# Patient Record
Sex: MALE | Race: WHITE | NOT HISPANIC OR LATINO
[De-identification: names, ages, dates, MRNs, and addresses within clinical notes are randomized per-mention and may not be internally consistent; named-entity substitution may affect disease eponyms.]

---

## 2018-06-28 ENCOUNTER — MOBILE ON CALL (OUTPATIENT)
Age: 83
End: 2018-06-28

## 2018-08-01 ENCOUNTER — APPOINTMENT (OUTPATIENT)
Dept: CARDIOLOGY | Facility: CLINIC | Age: 83
End: 2018-08-01

## 2019-09-09 NOTE — ASU PATIENT PROFILE, ADULT - PMH
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

## 2019-09-10 ENCOUNTER — OUTPATIENT (OUTPATIENT)
Dept: OUTPATIENT SERVICES | Facility: HOSPITAL | Age: 84
LOS: 1 days | Discharge: HOME | End: 2019-09-10

## 2019-09-10 VITALS
RESPIRATION RATE: 17 BRPM | HEART RATE: 79 BPM | WEIGHT: 138.01 LBS | HEIGHT: 65 IN | TEMPERATURE: 97 F | OXYGEN SATURATION: 97 % | SYSTOLIC BLOOD PRESSURE: 105 MMHG | DIASTOLIC BLOOD PRESSURE: 79 MMHG

## 2019-09-10 VITALS — HEART RATE: 82 BPM | RESPIRATION RATE: 15 BRPM | DIASTOLIC BLOOD PRESSURE: 98 MMHG | SYSTOLIC BLOOD PRESSURE: 125 MMHG

## 2019-09-10 DIAGNOSIS — Z95.1 PRESENCE OF AORTOCORONARY BYPASS GRAFT: Chronic | ICD-10-CM

## 2019-09-10 DIAGNOSIS — Z98.890 OTHER SPECIFIED POSTPROCEDURAL STATES: Chronic | ICD-10-CM

## 2019-09-10 DIAGNOSIS — Z95.810 PRESENCE OF AUTOMATIC (IMPLANTABLE) CARDIAC DEFIBRILLATOR: Chronic | ICD-10-CM

## 2019-09-10 RX ORDER — ASPIRIN/CALCIUM CARB/MAGNESIUM 324 MG
0 TABLET ORAL
Qty: 0 | Refills: 0 | DISCHARGE

## 2019-09-10 RX ORDER — AMIODARONE HYDROCHLORIDE 400 MG/1
2 TABLET ORAL
Qty: 0 | Refills: 0 | DISCHARGE

## 2019-09-10 RX ORDER — FOLIC ACID 0.8 MG
0 TABLET ORAL
Qty: 0 | Refills: 0 | DISCHARGE

## 2019-09-10 RX ORDER — IRON,CARBONYL 45 MG
0 TABLET ORAL
Qty: 0 | Refills: 0 | DISCHARGE

## 2019-09-10 RX ORDER — DIGOXIN 250 MCG
1 TABLET ORAL
Qty: 0 | Refills: 0 | DISCHARGE

## 2019-09-10 RX ORDER — ATORVASTATIN CALCIUM 80 MG/1
1 TABLET, FILM COATED ORAL
Qty: 0 | Refills: 0 | DISCHARGE

## 2019-09-10 RX ORDER — MINOCYCLINE HYDROCHLORIDE 45 MG/1
1 TABLET, EXTENDED RELEASE ORAL
Qty: 0 | Refills: 0 | DISCHARGE

## 2019-09-10 RX ORDER — WARFARIN SODIUM 2.5 MG/1
1 TABLET ORAL
Qty: 0 | Refills: 0 | DISCHARGE

## 2019-09-10 NOTE — PRE-ANESTHESIA EVALUATION ADULT - NSANTHADDINFOFT_GEN_ALL_CORE
Cardiology note reviewed,  case discussed with both ophthalmalogist and second ansthesia attending    limited vitrectomy just for access to front chamber, benefit out weights risk

## 2019-09-13 DIAGNOSIS — H25.11 AGE-RELATED NUCLEAR CATARACT, RIGHT EYE: ICD-10-CM

## 2019-09-13 DIAGNOSIS — E78.5 HYPERLIPIDEMIA, UNSPECIFIED: ICD-10-CM

## 2019-09-13 DIAGNOSIS — Z95.810 PRESENCE OF AUTOMATIC (IMPLANTABLE) CARDIAC DEFIBRILLATOR: ICD-10-CM

## 2019-09-13 DIAGNOSIS — I11.0 HYPERTENSIVE HEART DISEASE WITH HEART FAILURE: ICD-10-CM

## 2019-09-13 DIAGNOSIS — I50.9 HEART FAILURE, UNSPECIFIED: ICD-10-CM

## 2019-09-13 DIAGNOSIS — I48.91 UNSPECIFIED ATRIAL FIBRILLATION: ICD-10-CM

## 2019-09-13 DIAGNOSIS — I25.10 ATHEROSCLEROTIC HEART DISEASE OF NATIVE CORONARY ARTERY WITHOUT ANGINA PECTORIS: ICD-10-CM

## 2019-09-13 DIAGNOSIS — Z95.1 PRESENCE OF AORTOCORONARY BYPASS GRAFT: ICD-10-CM

## 2020-11-13 ENCOUNTER — EMERGENCY (EMERGENCY)
Facility: HOSPITAL | Age: 85
LOS: 0 days | Discharge: HOME | End: 2020-11-14
Attending: STUDENT IN AN ORGANIZED HEALTH CARE EDUCATION/TRAINING PROGRAM | Admitting: STUDENT IN AN ORGANIZED HEALTH CARE EDUCATION/TRAINING PROGRAM
Payer: MEDICARE

## 2020-11-13 VITALS
DIASTOLIC BLOOD PRESSURE: 85 MMHG | HEART RATE: 80 BPM | WEIGHT: 136.03 LBS | RESPIRATION RATE: 17 BRPM | OXYGEN SATURATION: 99 % | SYSTOLIC BLOOD PRESSURE: 117 MMHG | HEIGHT: 65 IN | TEMPERATURE: 98 F

## 2020-11-13 DIAGNOSIS — Z88.0 ALLERGY STATUS TO PENICILLIN: ICD-10-CM

## 2020-11-13 DIAGNOSIS — Z95.810 PRESENCE OF AUTOMATIC (IMPLANTABLE) CARDIAC DEFIBRILLATOR: Chronic | ICD-10-CM

## 2020-11-13 DIAGNOSIS — I25.10 ATHEROSCLEROTIC HEART DISEASE OF NATIVE CORONARY ARTERY WITHOUT ANGINA PECTORIS: ICD-10-CM

## 2020-11-13 DIAGNOSIS — Z20.828 CONTACT WITH AND (SUSPECTED) EXPOSURE TO OTHER VIRAL COMMUNICABLE DISEASES: ICD-10-CM

## 2020-11-13 DIAGNOSIS — Z98.890 OTHER SPECIFIED POSTPROCEDURAL STATES: ICD-10-CM

## 2020-11-13 DIAGNOSIS — E78.5 HYPERLIPIDEMIA, UNSPECIFIED: ICD-10-CM

## 2020-11-13 DIAGNOSIS — R79.89 OTHER SPECIFIED ABNORMAL FINDINGS OF BLOOD CHEMISTRY: ICD-10-CM

## 2020-11-13 DIAGNOSIS — Z95.1 PRESENCE OF AORTOCORONARY BYPASS GRAFT: Chronic | ICD-10-CM

## 2020-11-13 DIAGNOSIS — Z79.01 LONG TERM (CURRENT) USE OF ANTICOAGULANTS: ICD-10-CM

## 2020-11-13 DIAGNOSIS — Z98.890 OTHER SPECIFIED POSTPROCEDURAL STATES: Chronic | ICD-10-CM

## 2020-11-13 DIAGNOSIS — Z79.899 OTHER LONG TERM (CURRENT) DRUG THERAPY: ICD-10-CM

## 2020-11-13 DIAGNOSIS — Z87.891 PERSONAL HISTORY OF NICOTINE DEPENDENCE: ICD-10-CM

## 2020-11-13 DIAGNOSIS — R79.1 ABNORMAL COAGULATION PROFILE: ICD-10-CM

## 2020-11-13 DIAGNOSIS — I50.9 HEART FAILURE, UNSPECIFIED: ICD-10-CM

## 2020-11-13 LAB
ALBUMIN SERPL ELPH-MCNC: 4.6 G/DL — SIGNIFICANT CHANGE UP (ref 3.5–5.2)
ALP SERPL-CCNC: 85 U/L — SIGNIFICANT CHANGE UP (ref 30–115)
ALT FLD-CCNC: 20 U/L — SIGNIFICANT CHANGE UP (ref 0–41)
ANION GAP SERPL CALC-SCNC: 10 MMOL/L — SIGNIFICANT CHANGE UP (ref 7–14)
APTT BLD: 72.4 SEC — CRITICAL HIGH (ref 27–39.2)
AST SERPL-CCNC: 26 U/L — SIGNIFICANT CHANGE UP (ref 0–41)
BASOPHILS # BLD AUTO: 0.04 K/UL — SIGNIFICANT CHANGE UP (ref 0–0.2)
BASOPHILS NFR BLD AUTO: 0.6 % — SIGNIFICANT CHANGE UP (ref 0–1)
BILIRUB SERPL-MCNC: 0.8 MG/DL — SIGNIFICANT CHANGE UP (ref 0.2–1.2)
BUN SERPL-MCNC: 19 MG/DL — SIGNIFICANT CHANGE UP (ref 10–20)
CALCIUM SERPL-MCNC: 9.6 MG/DL — SIGNIFICANT CHANGE UP (ref 8.5–10.1)
CHLORIDE SERPL-SCNC: 100 MMOL/L — SIGNIFICANT CHANGE UP (ref 98–110)
CO2 SERPL-SCNC: 27 MMOL/L — SIGNIFICANT CHANGE UP (ref 17–32)
CREAT SERPL-MCNC: 1.1 MG/DL — SIGNIFICANT CHANGE UP (ref 0.7–1.5)
EOSINOPHIL # BLD AUTO: 0.13 K/UL — SIGNIFICANT CHANGE UP (ref 0–0.7)
EOSINOPHIL NFR BLD AUTO: 1.9 % — SIGNIFICANT CHANGE UP (ref 0–8)
GLUCOSE SERPL-MCNC: 126 MG/DL — HIGH (ref 70–99)
HCT VFR BLD CALC: 42.9 % — SIGNIFICANT CHANGE UP (ref 42–52)
HGB BLD-MCNC: 13.7 G/DL — LOW (ref 14–18)
IMM GRANULOCYTES NFR BLD AUTO: 0.4 % — HIGH (ref 0.1–0.3)
INR BLD: 10.13 RATIO — CRITICAL HIGH (ref 0.65–1.3)
LIDOCAIN IGE QN: 80 U/L — HIGH (ref 7–60)
LYMPHOCYTES # BLD AUTO: 1.17 K/UL — LOW (ref 1.2–3.4)
LYMPHOCYTES # BLD AUTO: 17.1 % — LOW (ref 20.5–51.1)
MCHC RBC-ENTMCNC: 31.6 PG — HIGH (ref 27–31)
MCHC RBC-ENTMCNC: 31.9 G/DL — LOW (ref 32–37)
MCV RBC AUTO: 98.8 FL — HIGH (ref 80–94)
MONOCYTES # BLD AUTO: 0.71 K/UL — HIGH (ref 0.1–0.6)
MONOCYTES NFR BLD AUTO: 10.4 % — HIGH (ref 1.7–9.3)
NEUTROPHILS # BLD AUTO: 4.76 K/UL — SIGNIFICANT CHANGE UP (ref 1.4–6.5)
NEUTROPHILS NFR BLD AUTO: 69.6 % — SIGNIFICANT CHANGE UP (ref 42.2–75.2)
NRBC # BLD: 0 /100 WBCS — SIGNIFICANT CHANGE UP (ref 0–0)
PLATELET # BLD AUTO: 180 K/UL — SIGNIFICANT CHANGE UP (ref 130–400)
POTASSIUM SERPL-MCNC: 4.4 MMOL/L — SIGNIFICANT CHANGE UP (ref 3.5–5)
POTASSIUM SERPL-SCNC: 4.4 MMOL/L — SIGNIFICANT CHANGE UP (ref 3.5–5)
PROT SERPL-MCNC: 7.2 G/DL — SIGNIFICANT CHANGE UP (ref 6–8)
PROTHROM AB SERPL-ACNC: >40 SEC — HIGH (ref 9.95–12.87)
RBC # BLD: 4.34 M/UL — LOW (ref 4.7–6.1)
RBC # FLD: 13.5 % — SIGNIFICANT CHANGE UP (ref 11.5–14.5)
SODIUM SERPL-SCNC: 137 MMOL/L — SIGNIFICANT CHANGE UP (ref 135–146)
WBC # BLD: 6.84 K/UL — SIGNIFICANT CHANGE UP (ref 4.8–10.8)
WBC # FLD AUTO: 6.84 K/UL — SIGNIFICANT CHANGE UP (ref 4.8–10.8)

## 2020-11-13 PROCEDURE — 93010 ELECTROCARDIOGRAM REPORT: CPT

## 2020-11-13 PROCEDURE — 71045 X-RAY EXAM CHEST 1 VIEW: CPT | Mod: 26

## 2020-11-13 PROCEDURE — 99218: CPT | Mod: CS

## 2020-11-13 RX ORDER — PHYTONADIONE (VIT K1) 5 MG
5 TABLET ORAL ONCE
Refills: 0 | Status: DISCONTINUED | OUTPATIENT
Start: 2020-11-13 | End: 2020-11-13

## 2020-11-13 RX ORDER — PHYTONADIONE (VIT K1) 5 MG
2.5 TABLET ORAL ONCE
Refills: 0 | Status: COMPLETED | OUTPATIENT
Start: 2020-11-13 | End: 2020-11-14

## 2020-11-13 NOTE — ED PROVIDER NOTE - OBJECTIVE STATEMENT
88 yold male to ED Pmhx CAD, CHF, GERDm, HLD, Afib, Aicd, LVAD 3/16; pt currently on coumadin for afib 5mg q24; has home visit inr checks - received call today for elevated INR 10.5; pt deneis bruising, bleeding, headache, melena, BRBPR; pt denies fever,chills, cough, back pain; pt denies any new otc med/abx; 88 yold male to ED Pmhx CAD, CHF, GERDm, HLD, Afib, Aicd, LVAD 3/16; pt currently on coumadin for afib 1mg q24; has home visit inr checks - received call today for elevated INR 10.5; pt deneis bruising, bleeding, headache, melena, BRBPR; pt denies fever,chills, cough, back pain; pt denies any new otc med/abx;

## 2020-11-13 NOTE — ED CDU PROVIDER INITIAL DAY NOTE - MEDICAL DECISION MAKING DETAILS
87 y/o M signif cardiovascular problems as noted, placed in OBS for asymptomatic surpatherapeutic INR. Pt has no complaint, no abnl bleeding. Vit K 2.5mg PO given. Will observe, check INR.

## 2020-11-13 NOTE — ED ADULT NURSE NOTE - OBJECTIVE STATEMENT
Pt came in for abnormal labs, INR at PMD office 10.5. Pt denies at falls, or bumping into things. Pt denies any chest pains, HA or weakness. Pt came in for abnormal labs, INR at PMD office 10.5. Pt denies at falls, or bumping into things. Pt denies any chest pains, HA or weakness. Pt had LVAD placed at [facility]. Son was told we need to call [phone] and notify them of any test results. Nurse Mary is the person on call to accept the call.

## 2020-11-13 NOTE — ED PROVIDER NOTE - ATTENDING CONTRIBUTION TO CARE
I personally evaluated the patient. I reviewed the Resident’s or Physician Assistant’s note (as assigned above), and agree with the findings and plan except as documented in my note.  88M with hx of afib on coumadin, CAD, CHF, LVAD 3/2016 with complaints of elevated INR. Pt had IBR check today per usual home visit and was told to come to ED due to IBR of 10.5. Reports alternating between 1mg and 1.5 mg and states that does has not changed. Denies taking more than usual, denies any bleeding from any site. No headaches/confusion. VS reviewed, pt non-toxic appearing, NAD. Head ncat, MMM, normal ROM, normal s1s2 without any murmurs, Lungs CTAB with normal work of breathing. abd +BS, s/nd/nt, extremities wnl, neuro exam grossly normal. No acute skin rashes. Plan is labs with inr check and manage accordingly.

## 2020-11-13 NOTE — ED CDU PROVIDER INITIAL DAY NOTE - OBJECTIVE STATEMENT
87y/o male with pmh of LVAD, hld, chf, cad, afib on coumadin, pt. was sent to er for elevated inr. pt. denies blood stool, hematuria, vomiting, cp, sob, abdominal pain, fever, cough. pt. without complaints.

## 2020-11-13 NOTE — ED PROVIDER NOTE - PHYSICAL EXAMINATION
Constitutional: Well developed, well nourished. NAD  Head: Normocephalic, atraumatic.  Eyes: PERRL, EOMI.  ENT: No nasal discharge. Mucous membranes dry.  Neck: Supple. Painless ROM.  Cardiovascular: Lvad device  Pulmonary: Lungs clear to auscultation bilaterally.   Abdominal: Soft. Nondistended. No rebound, guarding, rigidity.  Extremities. Pelvis stable. No lower extremity edema, symmetric calves.  Skin: No rashes, cyanosis.  Neuro: AAOx3. No focal neurological deficits.  Psych: Normal mood. Normal affect.

## 2020-11-13 NOTE — ED ADULT NURSE NOTE - PMH
Afib    CAD (coronary artery disease)    CHF (congestive heart failure)    FH: osteopenia    H/O gastroesophageal reflux (GERD)    History of BPH    Hyperlipidemia    Hypotension, chronic    Ischemic cardiomyopathy    LVAD (left ventricular assist device) present  march 2016

## 2020-11-13 NOTE — ED PROVIDER NOTE - CLINICAL SUMMARY MEDICAL DECISION MAKING FREE TEXT BOX
Pt presents for elevated INR and it was found to be 10.1. Was treated with 2.5mg of vitK. Will be placed in EDOU for INR checks and managed accordingly.

## 2020-11-13 NOTE — ED ADULT TRIAGE NOTE - CHIEF COMPLAINT QUOTE
pt takes coumadin for h/o of LVAD   sent in from PMD for INR of 10.5  pt denies any recent falls, or trauma

## 2020-11-14 VITALS — RESPIRATION RATE: 19 BRPM | OXYGEN SATURATION: 99 % | HEART RATE: 79 BPM

## 2020-11-14 PROBLEM — I25.5 ISCHEMIC CARDIOMYOPATHY: Chronic | Status: ACTIVE | Noted: 2019-09-10

## 2020-11-14 PROBLEM — I50.9 HEART FAILURE, UNSPECIFIED: Chronic | Status: ACTIVE | Noted: 2019-09-10

## 2020-11-14 PROBLEM — Z82.69: Chronic | Status: ACTIVE | Noted: 2019-09-10

## 2020-11-14 PROBLEM — E78.5 HYPERLIPIDEMIA, UNSPECIFIED: Chronic | Status: ACTIVE | Noted: 2019-09-10

## 2020-11-14 PROBLEM — Z87.438 PERSONAL HISTORY OF OTHER DISEASES OF MALE GENITAL ORGANS: Chronic | Status: ACTIVE | Noted: 2019-09-10

## 2020-11-14 PROBLEM — I95.89 OTHER HYPOTENSION: Chronic | Status: ACTIVE | Noted: 2019-09-10

## 2020-11-14 PROBLEM — Z95.811 PRESENCE OF HEART ASSIST DEVICE: Chronic | Status: ACTIVE | Noted: 2019-09-10

## 2020-11-14 PROBLEM — I48.91 UNSPECIFIED ATRIAL FIBRILLATION: Chronic | Status: ACTIVE | Noted: 2019-09-10

## 2020-11-14 PROBLEM — I25.10 ATHEROSCLEROTIC HEART DISEASE OF NATIVE CORONARY ARTERY WITHOUT ANGINA PECTORIS: Chronic | Status: ACTIVE | Noted: 2019-09-10

## 2020-11-14 PROBLEM — Z87.19 PERSONAL HISTORY OF OTHER DISEASES OF THE DIGESTIVE SYSTEM: Chronic | Status: ACTIVE | Noted: 2019-09-10

## 2020-11-14 LAB
APTT BLD: 64.1 SEC — HIGH (ref 27–39.2)
APTT BLD: 67.6 SEC — HIGH (ref 27–39.2)
BLD GP AB SCN SERPL QL: SIGNIFICANT CHANGE UP
DIGOXIN SERPL-MCNC: 0.8 NG/ML — SIGNIFICANT CHANGE UP (ref 0.8–2)
INR BLD: 5.12 RATIO — CRITICAL HIGH (ref 0.65–1.3)
INR BLD: 7.46 RATIO — CRITICAL HIGH (ref 0.65–1.3)
PROTHROM AB SERPL-ACNC: >40 SEC — HIGH (ref 9.95–12.87)
PROTHROM AB SERPL-ACNC: >40 SEC — HIGH (ref 9.95–12.87)
RAPID RVP RESULT: SIGNIFICANT CHANGE UP
SARS-COV-2 RNA SPEC QL NAA+PROBE: SIGNIFICANT CHANGE UP

## 2020-11-14 PROCEDURE — 99217: CPT | Mod: CS

## 2020-11-14 RX ADMIN — Medication 2.5 MILLIGRAM(S): at 00:04

## 2020-11-14 NOTE — ED CDU PROVIDER DISPOSITION NOTE - PATIENT PORTAL LINK FT
You can access the FollowMyHealth Patient Portal offered by [facility] by registering at the following website: [email or web address]. By joining [facility]’s FollowMyHealth portal, you will also be able to view your health information using other applications (apps) compatible with our system.

## 2020-11-14 NOTE — ED CDU PROVIDER DISPOSITION NOTE - ATTENDING CONTRIBUTION TO CARE
no acute OBS events. Repeat INR downtrended, eventually to INR 5.1. As per team at [facility], pt discharged, instructed to hold coumadin tonight and tomorrow and have INR repeated on Monday. Pt understands and agrees w/ plan. Pt understands signs and symptoms for ED return. DC home.

## 2020-11-14 NOTE — ED CDU PROVIDER DISPOSITION NOTE - NSFOLLOWUPINSTRUCTIONS_ED_ALL_ED_FT
*****DO NOT TAKE YOUR COUMADIN FOR TWO DOSES. DO NOT TAKE TONIGHT SATURDAY 11/14 and                                                              [place] 11/15  ***** HAVE YOUR INR LEVEL CHECKED ON MONDAY 11/16 BY YOUR VISITING NURSE  **** FOLLOW UP WITH YOUR DOCTOR AT [facility] IN 1 WEEK  **** FOLLOW UP WITH YOUR PMD - DR BUTLER IN 1 WEEK   **** ALL YOUR OTHER MEDICATIONS MAY CONTINUED TO BE TAKEN  **** RETURN TO THE ED IF YOUR SYMPTOMS WORSEN/RETURN

## 2020-11-14 NOTE — ED CDU PROVIDER SUBSEQUENT DAY NOTE - MEDICAL DECISION MAKING DETAILS
87 y/o M signif cardiovascular problems as noted, placed in OBS for asymptomatic surpatherapeutic INR. Pt has no complaint, no abnl bleeding. Vit K 2.5mg PO given. pt remains sx free. See progress notes.

## 2020-11-14 NOTE — ED ADULT NURSE REASSESSMENT NOTE - NS ED NURSE REASSESS COMMENT FT1
patient a.ox4 at bedside refused mattress alarm patient educated. patient ambulates steady independently with rolling walker, patient educated that he's a risk to fall and has elevated INR level causing risk to bleed. patient states he understands however refuses mattress alarm. will continue to assess and educate.      this nurse spoke to PA. waiting to repeat patient INR level, digoxin level wasn't done by prior shift as per pa okay to draw when repeating INR. patient a.ox4 at bedside refused mattress alarm patient educated. patient ambulates steady independently with rolling walker, patient educated that he's a risk to fall and has elevated INR level causing risk to bleed. patient states he understands however refuses mattress alarm. will continue to assess and educate.      this nurse spoke to PA. waiting to repeat patient INR level, digoxin level wasn't done by prior shift as per pa okay to draw when repeating INR.  patient has no signs of bleeding, denies any discomfort or bloody stools or urine. patient aware of plan of care..

## 2020-11-14 NOTE — ED CDU PROVIDER SUBSEQUENT DAY NOTE - ATTENDING CONTRIBUTION TO CARE
89 y/o M signif cardiovascular problems as noted, placed in OBS for asymptomatic surpatherapeutic INR. Pt has no complaint, no abnl bleeding. Vit K 2.5mg PO given. pt remains sx free. See progress notes.

## 2020-11-14 NOTE — ED ADULT NURSE REASSESSMENT NOTE - NS ED NURSE REASSESS COMMENT FT1
patient a.ox3 at bedside comfortable refuses to wear cardiac monitor at this time despite education. patient being transferred to [facility] s/p Covid results.  face sheet and information faxed over by Clerk Lawson to raquel [facility] coordinator.   will call back raquel when covid results come back for patient dis potion and bed assignment

## 2020-11-14 NOTE — ED CDU PROVIDER DISPOSITION NOTE - CLINICAL COURSE
no acute OBS events. Repeat INR downtrended, eventually to INR 5.1. As per team at [facility], pt discharged, instructed to hold coumadin tonight and tomorrow and have INR repeated on Monday. Pt understands and agrees w/ plan. Pt understands signs and symptoms for ED return. DC home.

## 2020-11-14 NOTE — ED CDU PROVIDER SUBSEQUENT DAY NOTE - HISTORY
Pt seen at bedside, NAD. Arrived overnight, received from [facility]RI[facility] Steele. Pt presenting for elevated INR. Pt's current INR= 10. Pt with LVAD placed by [facility]. Pt was given Vitamin K 2.5mg PO, currently waiting for repeat INR level, if continues to be elevated patient will be transferred to [facility]

## 2020-11-14 NOTE — ED CDU PROVIDER DISPOSITION NOTE - NSPREEXISTRELATION_ED_A_ED_FT
Patient with LVAD, placed at [facility], who continues to monitor patient. INR elevated and would like patient at [facility] for further management

## 2020-11-14 NOTE — ED CDU PROVIDER SUBSEQUENT DAY NOTE - PROGRESS NOTE DETAILS
[place]: Pt remains w/o complaint. NAD. I spoke w/ NP Edmund from [facility], and Ellyn from their transfer service. As per Edmund, recc transfer to [facility] given high INR, their teams wants to correct INR w/ pt there. Will transfer, rapid COVID pending. [place]: Rapid COVID neg. I discussed this with [facility] transfer and Cindy from vascular team- I'm now informed that there is presently no bed available for the pt, but their team is trying to clarify. I discussed with them that therapeutics have been held here as per the plan to begin them at [facility], but with the delay, should they be given here. As per their team, will hold therapeutics until bed availability is clarified. [place]once: I spoke with CHRIST White again from [facility]. It is unclear when bed will become available for pt. Joint plan made to continue treating here for elevated INR. their service requests repeat INR, if > 5 recc 2.5mg PO Vit K. If < 5 stable for dc.

## 2021-12-16 ENCOUNTER — APPOINTMENT (OUTPATIENT)
Dept: NEUROLOGY | Facility: CLINIC | Age: 86
End: 2021-12-16
Payer: COMMERCIAL

## 2021-12-16 DIAGNOSIS — Z78.9 OTHER SPECIFIED HEALTH STATUS: ICD-10-CM

## 2021-12-16 DIAGNOSIS — Z95.811 PRESENCE OF HEART ASSIST DEVICE: ICD-10-CM

## 2021-12-16 DIAGNOSIS — M54.16 RADICULOPATHY, LUMBAR REGION: ICD-10-CM

## 2021-12-16 PROCEDURE — 99203 OFFICE O/P NEW LOW 30 MIN: CPT

## 2021-12-16 RX ORDER — MINOCYCLINE HYDROCHLORIDE 100 MG/1
100 CAPSULE ORAL DAILY
Qty: 30 | Refills: 0 | Status: ACTIVE | COMMUNITY
Start: 2021-12-16

## 2021-12-16 RX ORDER — DIGOXIN 125 UG/1
125 TABLET ORAL DAILY
Qty: 30 | Refills: 0 | Status: ACTIVE | COMMUNITY
Start: 2021-12-16

## 2021-12-16 RX ORDER — WARFARIN 1 MG/1
1 TABLET ORAL DAILY
Qty: 30 | Refills: 0 | Status: ACTIVE | COMMUNITY
Start: 2021-12-16

## 2021-12-16 NOTE — REVIEW OF SYSTEMS
[Feeling Tired] : feeling tired [As Noted in HPI] : as noted in HPI [Eyesight Problems] : eyesight problems [As noted in HPI] : as noted in HPI [Joint Pain] : joint pain [Negative] : Heme/Lymph

## 2021-12-16 NOTE — DISCUSSION/SUMMARY
[FreeTextEntry1] : Mr. Fair is a 88yo man with LE weakness and burning/itching complaints.  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 xray lumbar spine\par 2. Physical therapy for lower back\par 3. Depending on findings may need f/u CT Lumbar spine (unable to do MRI)\par 4. f/u in 6 months

## 2021-12-16 NOTE — PHYSICAL EXAM
[FreeTextEntry1] : MS: Awake, alert, oriented to person, place, situation and time.  Follows commands. \par \par Language: Speech is clear, fluent. No dysarthria. \par \par CNs 2 - 12 intact. EOMI no nystagmus, no diplopia. No facial asymmetry b/l. Tongue midline, normal movements, no atrophy.\par \par Motor:power 5/5 throughout except in b/l HF 4+/5 and b/l Shoulder abduction 5-/5\par \par Sensation: Intact to LT, Temp, Vib, PP b/l throughout\par \par Cortical: No extinction\par \par Coordination: Intact rapid-alternating movements. No dysmetria to FTN. \par \par DTR: absent in biceps, brachioradialis and triceps;  absent in patellar and ankle; plantars are down b/l\par \par Gait: need assistance with ambulation.\par \par General: Alert and oriented to person, place, time, and situation. In no acute distress. Cooperative. Follows commands. \par Eyes: Sclera and conjunctiva were normal and pupils were equal in size, round, reactive to light, with normal accommodation\par ENT: Ears and nose normal in appearance. \par Vascular: No peripheral edema.\par Respiratory: No visible respiratory distress. \par Musculoskeletal: No involuntary movements were seen.\par Skin: Ndark discoloration of b/l LE.\par

## 2021-12-16 NOTE — HISTORY OF PRESENT ILLNESS
[FreeTextEntry1] : Mr. Fair is a 89yo man with PMHX below here for evaluation of burning itching in the lower back and legs.  He says for the last few months he noticed a area in his lower back which is very specific with any itchy feeling and burning.  When he pushes in that area it hurts and feels swollen.  Lately over the last couple of months he has been getting burning and some pain shooting down his buttocks and going down his leg to his foot.  He does not have any severe back pain and intermittently says its mild.  He has not been very active since his LVAD surgery and most of these issues started following this surgery.  He uses a cane and walker intermittently.\par He also has had for last few years burning in the left arm mostly forearm to hand.

## 2022-10-19 NOTE — ED ADULT NURSE REASSESSMENT NOTE - NS ED NURSE REASSESS COMMENT FT1
Assumed care from previous nurse Wen c/o elevated INR , with LVAD device roomed in at room 16 , bed A , placed on continuous cardiac monitor , on observation status , denies chest pain , denies abdominal pain , denies headache , denies dizziness , denies nausea , no vomiting noted , no labored breathing noted , no accessory muscles used , AO x 4 , no SOB , speaks in full sentences . LVAD team nurse from [place] nurse Hernandez requested for am labs , informed CHRIST Jeronimo , awaiting for orders /

## 2023-01-12 ENCOUNTER — OUTPATIENT (OUTPATIENT)
Dept: OUTPATIENT SERVICES | Facility: HOSPITAL | Age: 88
LOS: 1 days | Discharge: HOME | End: 2023-01-12

## 2023-01-12 ENCOUNTER — APPOINTMENT (OUTPATIENT)
Dept: OPHTHALMOLOGY | Facility: CLINIC | Age: 88
End: 2023-01-12
Payer: COMMERCIAL

## 2023-01-12 DIAGNOSIS — Z95.810 PRESENCE OF AUTOMATIC (IMPLANTABLE) CARDIAC DEFIBRILLATOR: Chronic | ICD-10-CM

## 2023-01-12 DIAGNOSIS — Z95.1 PRESENCE OF AORTOCORONARY BYPASS GRAFT: Chronic | ICD-10-CM

## 2023-01-12 DIAGNOSIS — Z98.890 OTHER SPECIFIED POSTPROCEDURAL STATES: Chronic | ICD-10-CM

## 2023-01-12 PROCEDURE — 99203 OFFICE O/P NEW LOW 30 MIN: CPT

## 2024-10-14 NOTE — ED ADULT TRIAGE NOTE - HEIGHT IN INCHES
Pt seen in office today and states that there is an issue at his pharmacy with the pantoprazole. I will call pharmacy to see what is going on.    5

## 2025-05-08 ENCOUNTER — EMERGENCY (EMERGENCY)
Facility: HOSPITAL | Age: 89
LOS: 0 days | Discharge: HOME | End: 2025-05-08
Attending: EMERGENCY MEDICINE
Payer: MEDICARE

## 2025-05-08 ENCOUNTER — TRANSCRIPTION ENCOUNTER (OUTPATIENT)
Age: 89
End: 2025-05-08

## 2025-05-08 VITALS
RESPIRATION RATE: 16 BRPM | HEART RATE: 80 BPM | DIASTOLIC BLOOD PRESSURE: 90 MMHG | OXYGEN SATURATION: 99 % | SYSTOLIC BLOOD PRESSURE: 127 MMHG | TEMPERATURE: 98 F

## 2025-05-08 VITALS
SYSTOLIC BLOOD PRESSURE: 121 MMHG | HEIGHT: 65.5 IN | WEIGHT: 130.07 LBS | DIASTOLIC BLOOD PRESSURE: 73 MMHG | RESPIRATION RATE: 16 BRPM | HEART RATE: 76 BPM | TEMPERATURE: 98 F | OXYGEN SATURATION: 96 %

## 2025-05-08 DIAGNOSIS — Z95.810 PRESENCE OF AUTOMATIC (IMPLANTABLE) CARDIAC DEFIBRILLATOR: Chronic | ICD-10-CM

## 2025-05-08 DIAGNOSIS — Z95.811 PRESENCE OF HEART ASSIST DEVICE: ICD-10-CM

## 2025-05-08 DIAGNOSIS — K21.9 GASTRO-ESOPHAGEAL REFLUX DISEASE WITHOUT ESOPHAGITIS: ICD-10-CM

## 2025-05-08 DIAGNOSIS — I50.9 HEART FAILURE, UNSPECIFIED: ICD-10-CM

## 2025-05-08 DIAGNOSIS — E87.1 HYPO-OSMOLALITY AND HYPONATREMIA: ICD-10-CM

## 2025-05-08 DIAGNOSIS — Z95.810 PRESENCE OF AUTOMATIC (IMPLANTABLE) CARDIAC DEFIBRILLATOR: ICD-10-CM

## 2025-05-08 DIAGNOSIS — N48.1 BALANITIS: ICD-10-CM

## 2025-05-08 DIAGNOSIS — R10.84 GENERALIZED ABDOMINAL PAIN: ICD-10-CM

## 2025-05-08 DIAGNOSIS — I48.91 UNSPECIFIED ATRIAL FIBRILLATION: ICD-10-CM

## 2025-05-08 DIAGNOSIS — Z95.1 PRESENCE OF AORTOCORONARY BYPASS GRAFT: Chronic | ICD-10-CM

## 2025-05-08 DIAGNOSIS — Z88.0 ALLERGY STATUS TO PENICILLIN: ICD-10-CM

## 2025-05-08 DIAGNOSIS — E78.5 HYPERLIPIDEMIA, UNSPECIFIED: ICD-10-CM

## 2025-05-08 DIAGNOSIS — Z98.890 OTHER SPECIFIED POSTPROCEDURAL STATES: Chronic | ICD-10-CM

## 2025-05-08 DIAGNOSIS — I25.10 ATHEROSCLEROTIC HEART DISEASE OF NATIVE CORONARY ARTERY WITHOUT ANGINA PECTORIS: ICD-10-CM

## 2025-05-08 LAB
ALBUMIN SERPL ELPH-MCNC: 4.1 G/DL — SIGNIFICANT CHANGE UP (ref 3.5–5.2)
ALP SERPL-CCNC: 91 U/L — SIGNIFICANT CHANGE UP (ref 30–115)
ALT FLD-CCNC: 11 U/L — SIGNIFICANT CHANGE UP (ref 0–41)
ANION GAP SERPL CALC-SCNC: 8 MMOL/L — SIGNIFICANT CHANGE UP (ref 7–14)
APPEARANCE UR: CLEAR — SIGNIFICANT CHANGE UP
APTT BLD: 40.7 SEC — HIGH (ref 27–39.2)
AST SERPL-CCNC: 25 U/L — SIGNIFICANT CHANGE UP (ref 0–41)
BASOPHILS # BLD AUTO: 0.03 K/UL — SIGNIFICANT CHANGE UP (ref 0–0.2)
BASOPHILS NFR BLD AUTO: 0.7 % — SIGNIFICANT CHANGE UP (ref 0–1)
BILIRUB SERPL-MCNC: 1.1 MG/DL — SIGNIFICANT CHANGE UP (ref 0.2–1.2)
BILIRUB UR-MCNC: NEGATIVE — SIGNIFICANT CHANGE UP
BUN SERPL-MCNC: 22 MG/DL — HIGH (ref 10–20)
CALCIUM SERPL-MCNC: 9.1 MG/DL — SIGNIFICANT CHANGE UP (ref 8.4–10.5)
CHLORIDE SERPL-SCNC: 93 MMOL/L — LOW (ref 98–110)
CO2 SERPL-SCNC: 25 MMOL/L — SIGNIFICANT CHANGE UP (ref 17–32)
COLOR SPEC: YELLOW — SIGNIFICANT CHANGE UP
CREAT SERPL-MCNC: 1 MG/DL — SIGNIFICANT CHANGE UP (ref 0.7–1.5)
DIFF PNL FLD: NEGATIVE — SIGNIFICANT CHANGE UP
EGFR: 71 ML/MIN/1.73M2 — SIGNIFICANT CHANGE UP
EGFR: 71 ML/MIN/1.73M2 — SIGNIFICANT CHANGE UP
EOSINOPHIL # BLD AUTO: 0.08 K/UL — SIGNIFICANT CHANGE UP (ref 0–0.7)
EOSINOPHIL NFR BLD AUTO: 1.8 % — SIGNIFICANT CHANGE UP (ref 0–8)
GLUCOSE SERPL-MCNC: 96 MG/DL — SIGNIFICANT CHANGE UP (ref 70–99)
GLUCOSE UR QL: NEGATIVE MG/DL — SIGNIFICANT CHANGE UP
HCT VFR BLD CALC: 38.3 % — LOW (ref 42–52)
HGB BLD-MCNC: 12.9 G/DL — LOW (ref 14–18)
IMM GRANULOCYTES NFR BLD AUTO: 0.2 % — SIGNIFICANT CHANGE UP (ref 0.1–0.3)
INR BLD: 2.32 RATIO — HIGH (ref 0.65–1.3)
KETONES UR-MCNC: NEGATIVE MG/DL — SIGNIFICANT CHANGE UP
LEUKOCYTE ESTERASE UR-ACNC: NEGATIVE — SIGNIFICANT CHANGE UP
LIDOCAIN IGE QN: 68 U/L — HIGH (ref 7–60)
LYMPHOCYTES # BLD AUTO: 0.91 K/UL — LOW (ref 1.2–3.4)
LYMPHOCYTES # BLD AUTO: 20 % — LOW (ref 20.5–51.1)
MCHC RBC-ENTMCNC: 32.4 PG — HIGH (ref 27–31)
MCHC RBC-ENTMCNC: 33.7 G/DL — SIGNIFICANT CHANGE UP (ref 32–37)
MCV RBC AUTO: 96.2 FL — HIGH (ref 80–94)
MONOCYTES # BLD AUTO: 0.77 K/UL — HIGH (ref 0.1–0.6)
MONOCYTES NFR BLD AUTO: 16.9 % — HIGH (ref 1.7–9.3)
NEUTROPHILS # BLD AUTO: 2.75 K/UL — SIGNIFICANT CHANGE UP (ref 1.4–6.5)
NEUTROPHILS NFR BLD AUTO: 60.4 % — SIGNIFICANT CHANGE UP (ref 42.2–75.2)
NITRITE UR-MCNC: NEGATIVE — SIGNIFICANT CHANGE UP
NRBC BLD AUTO-RTO: 0 /100 WBCS — SIGNIFICANT CHANGE UP (ref 0–0)
PH UR: 6.5 — SIGNIFICANT CHANGE UP (ref 5–8)
PLATELET # BLD AUTO: 150 K/UL — SIGNIFICANT CHANGE UP (ref 130–400)
PMV BLD: 8.9 FL — SIGNIFICANT CHANGE UP (ref 7.4–10.4)
POTASSIUM SERPL-MCNC: 4.8 MMOL/L — SIGNIFICANT CHANGE UP (ref 3.5–5)
POTASSIUM SERPL-SCNC: 4.8 MMOL/L — SIGNIFICANT CHANGE UP (ref 3.5–5)
PROT SERPL-MCNC: 6.6 G/DL — SIGNIFICANT CHANGE UP (ref 6–8)
PROT UR-MCNC: NEGATIVE MG/DL — SIGNIFICANT CHANGE UP
PROTHROM AB SERPL-ACNC: 27.8 SEC — HIGH (ref 9.95–12.87)
RBC # BLD: 3.98 M/UL — LOW (ref 4.7–6.1)
RBC # FLD: 13.2 % — SIGNIFICANT CHANGE UP (ref 11.5–14.5)
SODIUM SERPL-SCNC: 126 MMOL/L — LOW (ref 135–146)
SP GR SPEC: 1.01 — SIGNIFICANT CHANGE UP (ref 1–1.03)
UROBILINOGEN FLD QL: 1 MG/DL — SIGNIFICANT CHANGE UP (ref 0.2–1)
WBC # BLD: 4.55 K/UL — LOW (ref 4.8–10.8)
WBC # FLD AUTO: 4.55 K/UL — LOW (ref 4.8–10.8)

## 2025-05-08 PROCEDURE — 85730 THROMBOPLASTIN TIME PARTIAL: CPT

## 2025-05-08 PROCEDURE — 99291 CRITICAL CARE FIRST HOUR: CPT

## 2025-05-08 PROCEDURE — 99284 EMERGENCY DEPT VISIT MOD MDM: CPT | Mod: 25

## 2025-05-08 PROCEDURE — 81003 URINALYSIS AUTO W/O SCOPE: CPT

## 2025-05-08 PROCEDURE — 36415 COLL VENOUS BLD VENIPUNCTURE: CPT

## 2025-05-08 PROCEDURE — 74177 CT ABD & PELVIS W/CONTRAST: CPT

## 2025-05-08 PROCEDURE — 36000 PLACE NEEDLE IN VEIN: CPT | Mod: XU

## 2025-05-08 PROCEDURE — 74177 CT ABD & PELVIS W/CONTRAST: CPT | Mod: 26

## 2025-05-08 PROCEDURE — 80053 COMPREHEN METABOLIC PANEL: CPT

## 2025-05-08 PROCEDURE — 85025 COMPLETE CBC W/AUTO DIFF WBC: CPT

## 2025-05-08 PROCEDURE — 83690 ASSAY OF LIPASE: CPT

## 2025-05-08 PROCEDURE — 85610 PROTHROMBIN TIME: CPT

## 2025-05-08 RX ORDER — CLOTRIMAZOLE 1 G/100G
1 CREAM TOPICAL
Qty: 1 | Refills: 0
Start: 2025-05-08 | End: 2025-05-21

## 2025-05-08 NOTE — ED PROVIDER NOTE - PATIENT PORTAL LINK FT
You can access the FollowMyHealth Patient Portal offered by [facility] by registering at the following website: [email or web address]. By joining [facility]’s FollowMyHealth portal, you will also be able to view your health information using other applications (apps) compatible with our system.

## 2025-05-08 NOTE — ED PROVIDER NOTE - CLINICAL SUMMARY MEDICAL DECISION MAKING FREE TEXT BOX
Patient found to be hyponatremic at 126.  As per son patient had diarrhea on Sunday but symptoms resolved and patient has good appetite.  Family do not wish for patient to be admitted but would rather follow-up with PMD in the morning.    Patient also has mild irritation to head of the penis.  Will treat for balanitis.  No evidence of UTI.  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  Evaluated by vascular surgery.  Outpatient follow-up established.  Will DC patient.  Copies of all results provided.

## 2025-05-08 NOTE — ED PROVIDER NOTE - PHYSICAL EXAMINATION
pt in NAD, AAOx3, head NC/AT, CN II-XII intact, PEERL, EOMi, neck (-) midline tenderness, lungs CTA B/L, CV S1S2 regular, abdomen soft/NT/ND/(+)BS,  uncircumcised penis, (-) lesions or discharge, ext (-) edema, motor 5/5x4, sensation intact, ambulating with a walker

## 2025-05-08 NOTE — ED PROVIDER NOTE - PROGRESS NOTE DETAILS
Labs and CT results noted.  Patient sodium was 126.  Multiple attempts to call patient's physician at [facility] without success.  Transfer offered but patient and son at bedside decided to follow-up with PMD in a.m. for repeat sodium instead.  CT results also noted.  Focal infrarenal aortic dissection noted–patient evaluated by vascular surgery–advised to follow-up with Dr. Reyes in 2 weeks.  Attempts to notify patient's cardiologist and LVAD team unsuccessful.  Results discussed with son and patient.  Copy of results provided.  Will DC with PMD follow-up in the morning.

## 2025-05-08 NOTE — ED PROVIDER NOTE - OBJECTIVE STATEMENT
89-year-old male, past medical history of CAD, CHF, GERD, hyperlipidemia, A-fib, AICD, LVAD 3/16, on Coumadin, comes in complaining of suprapubic pressure for the last couple days.  Patient denies any fever/chills, chest pain/shortness of breath, abdominal pain, nausea/vomiting, diarrhea/constipation.  No difficulty urinating.

## 2025-05-08 NOTE — ED PROVIDER NOTE - CARE PLAN
Principal Discharge DX:	Balanitis  Secondary Diagnosis:	Hyponatremia   1 Principal Discharge DX:	Balanitis  Secondary Diagnosis:	Hyponatremia  Secondary Diagnosis:	LVAD (left ventricular assist device) present

## 2025-05-08 NOTE — CONSULT NOTE ADULT - SUBJECTIVE AND OBJECTIVE BOX
VASCULAR SURGERY CONSULT NOTE      HPI:  Dashawn Sorto is a 89-year-old male with a complex cardiac history, including CAD, CHF, AFib, AICD, and LVAD (placed 2025), currently on Coumadin. He presented with suprapubic discomfort for several days. He is afebrile, hemodynamically stable, and without signs of acute abdomen or peritonitis. On exam, he has mild suprapubic tenderness, but no rebound or guarding. No referred pain to the back.  Pulses: Dorsalis pedis (DP) and posterior tibial (PT) pulses are 2+ bilaterally and symmetric.  BP: Stable, currently in the low 130s systolic.  Imaging: CT abdomen/pelvis with IV contrast revealed a focal, segmental infrarenal aortic dissection (5 cm length, 2 cm transverse). No evidence of rupture or malperfusion.  Labs:  Hgb: 12.9  WBC: 4.55  INR: 2.3  Sodium: 126  Creatinine: 1.0 (baseline)  UA: Negative         PAST MEDICAL & SURGICAL HISTORY:  Hyperlipidemia      Ischemic cardiomyopathy      CHF (congestive heart failure)      LVAD (left ventricular assist device) present  2016      Afib      History of BPH      CAD (coronary artery disease)      H/O gastroesophageal reflux (GERD)      FH: osteopenia      Hypotension, chronic      S/P triple vessel bypass        Presence of cardiac defibrillator        S/P hernia repair          penicillins (Hives; Rash)    Home Medications:  amiodarone 100 mg oral tablet: 2 tab(s) orally once a day (10 Sep 2019 12:46)  Aspir 81:  (10 Sep 2019 12:46)  atorvastatin 20 mg oral tablet: 1 tab(s) orally once a day (10 Sep 2019 12:46)  Calcium 500+D oral tablet, chewable: 1 tab(s) orally once a day (10 Sep 2019 12:46)  digoxin 125 mcg (0.125 mg) oral tablet: 1 tab(s) orally  (10 Sep 2019 12:46)  Feosol Caplet:  (10 Sep 2019 12:46)  folic acid:  (10 Sep 2019 12:46)  minocycline 100 mg oral tablet: 1 tab(s) orally  (10 Sep 2019 12:46)  warfarin 1 mg oral tablet: 1 tab(s) orally once a day (10 Sep 2019 12:46)    No permtinent family history of PVD    ALLERGIC/IMMUNOLOGIC:            negative    12 point ROS otherwise normal except as stated in HPI  FHx: none  SHX:  [ ]  smoking     [ ] alcohol use    PHYSICAL EXAM  Vital Signs Last 24 Hrs  T(C): 36.6 (08 May 2025 15:56), Max: 36.9 (08 May 2025 14:08)  T(F): 97.9 (08 May 2025 15:56), Max: 98.5 (08 May 2025 14:08)  HR: 80 (08 May 2025 15:56) (76 - 80)  BP: 127/90 (08 May 2025 15:56) (121/73 - 127/90)  BP(mean): --  RR: 16 (08 May 2025 15:56) (16 - 16)  SpO2: 99% (08 May 2025 15:56) (96% - 99%)    Parameters below as of 08 May 2025 15:56  Patient On (Oxygen Delivery Method): room air    PHYSICAL EXAM:  • General:  Patient is in no acute distress. Appears comfortable at rest.  • Neurological:  Alert and oriented to person, place, and time (A&O x3).  Cranial nerves II–XII intact. No focal neurological deficits noted.  • Head:  Normocephalic, atraumatic. No headache or scalp tenderness reported.  • Neck/Spine:  No cervical lymphadenopathy. No neck pain or midline spinal tenderness.  • Respiratory:  Lungs clear to auscultation bilaterally. Normal chest wall expansion. No respiratory distress.  • Cardiovascular:  Regular rate and rhythm. S1 and S2 present. No murmurs, rubs, or gallops.  Peripheral pulses (DP and PT) are 2+ bilaterally and symmetric.  • Abdomen:  Soft, non-distended. Mild tenderness in the suprapubic region.  No rebound, guarding, or signs of peritonitis. No flank tenderness.  • Extremities:  No edema. Warm and well-perfused. Pulses as above.      PULSES:  Dorsal Pedal: signals  Posterior Tibial: signals      MEDICATIONS:   MEDICATIONS  (STANDING):    MEDICATIONS  (PRN):      LAB/STUDIES:                        12.9   4.55  )-----------( 150      ( 08 May 2025 15:26 )             38.3     05-08    126[L]  |  93[L]  |  22[H]  ----------------------------<  96  4.8   |  25  |  1.0    Ca    9.1      08 May 2025 15:26    TPro  6.6  /  Alb  4.1  /  TBili  1.1  /  DBili  x   /  AST  25  /  ALT  11  /  AlkPhos  91  05-08    PT/INR - ( 08 May 2025 15:26 )   PT: 27.80 sec;   INR: 2.32 ratio         PTT - ( 08 May 2025 15: )  PTT:40.7 sec  LIVER FUNCTIONS - ( 08 May 2025 15:26 )  Alb: 4.1 g/dL / Pro: 6.6 g/dL / ALK PHOS: 91 U/L / ALT: 11 U/L / AST: 25 U/L / GGT: x             Urinalysis Basic - ( 08 May 2025 15:28 )    Color: Yellow / Appearance: Clear / S.009 / pH: x  Gluc: x / Ketone: Negative mg/dL  / Bili: Negative / Urobili: 1.0 mg/dL   Blood: x / Protein: Negative mg/dL / Nitrite: Negative   Leuk Esterase: Negative / RBC: x / WBC x   Sq Epi: x / Non Sq Epi: x / Bacteria: x      Urinalysis with Rflx Culture (collected 08 May 2025 15:28)    ASSESSMENT:  Dashawn Sorto is a 89-year-old male who is hemodynamically stable with a focal, segmental infrarenal aortic dissection noted on CT imaging. There are no signs of rupture or malperfusion. On physical exam, he has mild suprapubic tenderness without peritoneal signs, and his dorsalis pedis and posterior tibial pulses are 2+ and symmetric bilaterally. His presentation and stability support conservative management at this time.    PLAN:  • No acute vascular intervention  • Outpatient follow-up with Dr. Reyes in 2 weeks.  • Notify Cardiology and ensure his 0LVAD team are aware.    VASCULAR SURGERY SPECTRA: 9370 VASCULAR SURGERY CONSULT NOTE      HPI:  Dashawn Sorto is a 89-year-old male with a complex cardiac history, including CAD, CHF, AFib, AICD, and LVAD (placed 2025), currently on Coumadin. He presented with suprapubic discomfort for several days. He is afebrile, hemodynamically stable, and without signs of acute abdomen or peritonitis. On exam, he has mild suprapubic tenderness, but no rebound or guarding. No referred pain to the back.  Pulses: Dorsalis pedis (DP) and posterior tibial (PT) pulses are 2+ bilaterally and symmetric.  BP: Stable, currently in the low 130s systolic.  Imaging: CT abdomen/pelvis with IV contrast revealed a chronic  focal, segmental infrarenal aortic dissection (5 cm length, 2 cm transverse). No evidence of rupture or malperfusion.  Labs:  Hgb: 12.9  WBC: 4.55  INR: 2.3  Sodium: 126  Creatinine: 1.0 (baseline)  UA: Negative         PAST MEDICAL & SURGICAL HISTORY:  Hyperlipidemia      Ischemic cardiomyopathy      CHF (congestive heart failure)      LVAD (left ventricular assist device) present  2016      Afib      History of BPH      CAD (coronary artery disease)      H/O gastroesophageal reflux (GERD)      FH: osteopenia      Hypotension, chronic      S/P triple vessel bypass        Presence of cardiac defibrillator        S/P hernia repair          penicillins (Hives; Rash)    Home Medications:  amiodarone 100 mg oral tablet: 2 tab(s) orally once a day (10 Sep 2019 12:46)  Aspir 81:  (10 Sep 2019 12:46)  atorvastatin 20 mg oral tablet: 1 tab(s) orally once a day (10 Sep 2019 12:46)  Calcium 500+D oral tablet, chewable: 1 tab(s) orally once a day (10 Sep 2019 12:46)  digoxin 125 mcg (0.125 mg) oral tablet: 1 tab(s) orally  (10 Sep 2019 12:46)  Feosol Caplet:  (10 Sep 2019 12:46)  folic acid:  (10 Sep 2019 12:46)  minocycline 100 mg oral tablet: 1 tab(s) orally  (10 Sep 2019 12:46)  warfarin 1 mg oral tablet: 1 tab(s) orally once a day (10 Sep 2019 12:46)    No permtinent family history of PVD    ALLERGIC/IMMUNOLOGIC:            negative    12 point ROS otherwise normal except as stated in HPI  FHx: none  SHX:  [ ]  smoking     [ ] alcohol use    PHYSICAL EXAM  Vital Signs Last 24 Hrs  T(C): 36.6 (08 May 2025 15:56), Max: 36.9 (08 May 2025 14:08)  T(F): 97.9 (08 May 2025 15:56), Max: 98.5 (08 May 2025 14:08)  HR: 80 (08 May 2025 15:56) (76 - 80)  BP: 127/90 (08 May 2025 15:56) (121/73 - 127/90)  BP(mean): --  RR: 16 (08 May 2025 15:56) (16 - 16)  SpO2: 99% (08 May 2025 15:56) (96% - 99%)    Parameters below as of 08 May 2025 15:56  Patient On (Oxygen Delivery Method): room air    PHYSICAL EXAM:  • General:  Patient is in no acute distress. Appears comfortable at rest.  • Neurological:  Alert and oriented to person, place, and time (A&O x3).  Cranial nerves II–XII intact. No focal neurological deficits noted.  • Head:  Normocephalic, atraumatic. No headache or scalp tenderness reported.  • Neck/Spine:  No cervical lymphadenopathy. No neck pain or midline spinal tenderness.  • Respiratory:  Lungs clear to auscultation bilaterally. Normal chest wall expansion. No respiratory distress.  • Cardiovascular:  Regular rate and rhythm. S1 and S2 present. No murmurs, rubs, or gallops.  Peripheral pulses (DP and PT) are 2+ bilaterally and symmetric.  • Abdomen:  Soft, non-distended. Mild tenderness in the suprapubic region.  No rebound, guarding, or signs of peritonitis. No flank tenderness.  • Extremities:  No edema. Warm and well-perfused. Pulses as above.      PULSES:  Dorsal Pedal: signals  Posterior Tibial: signals      MEDICATIONS:   MEDICATIONS  (STANDING):    MEDICATIONS  (PRN):      LAB/STUDIES:                        12.9   4.55  )-----------( 150      ( 08 May 2025 15:26 )             38.3     05-08    126[L]  |  93[L]  |  22[H]  ----------------------------<  96  4.8   |  25  |  1.0    Ca    9.1      08 May 2025 15:26    TPro  6.6  /  Alb  4.1  /  TBili  1.1  /  DBili  x   /  AST  25  /  ALT  11  /  AlkPhos  91  05-08    PT/INR - ( 08 May 2025 15:26 )   PT: 27.80 sec;   INR: 2.32 ratio         PTT - ( 08 May 2025 15: )  PTT:40.7 sec  LIVER FUNCTIONS - ( 08 May 2025 15: )  Alb: 4.1 g/dL / Pro: 6.6 g/dL / ALK PHOS: 91 U/L / ALT: 11 U/L / AST: 25 U/L / GGT: x             Urinalysis Basic - ( 08 May 2025 15:28 )    Color: Yellow / Appearance: Clear / S.009 / pH: x  Gluc: x / Ketone: Negative mg/dL  / Bili: Negative / Urobili: 1.0 mg/dL   Blood: x / Protein: Negative mg/dL / Nitrite: Negative   Leuk Esterase: Negative / RBC: x / WBC x   Sq Epi: x / Non Sq Epi: x / Bacteria: x      Urinalysis with Rflx Culture (collected 08 May 2025 15:28)    ASSESSMENT:  Dashawn Sorto is a 89-year-old male who is hemodynamically stable with a focal, segmental infrarenal aortic dissection noted on CT imaging. There are no signs of rupture or malperfusion. On physical exam, he has mild suprapubic tenderness without peritoneal signs, and his dorsalis pedis and posterior tibial pulses are 2+ and symmetric bilaterally. His presentation and stability support conservative management at this time.    PLAN:  • No acute vascular intervention  • Outpatient follow-up with Dr. Reyes in 2 weeks.  • Notify Cardiology and ensure his 0LVAD team are aware.    VASCULAR SURGERY SPECTRA: [id number] VASCULAR SURGERY CONSULT NOTE      HPI:  Dashawn Fair is a 89-year-old male with a complex cardiac history, including CAD, CHF, AFib, AICD, and LVAD (placed 2025), currently on Coumadin. He presented with suprapubic discomfort for several days. He is afebrile, hemodynamically stable, and without signs of acute abdomen or peritonitis. On exam, he has mild suprapubic tenderness, but no rebound or guarding. No referred pain to the back.  Pulses: DP and PT pulses are 2+ bilaterally and symmetric.  BP: Stable, currently in the low 130s systolic.  Imaging: CT abdomen/pelvis with IV contrast revealed a chronic  focal, segmental infrarenal aortic dissection (5 cm length, 2 cm transverse). No evidence of rupture or malperfusion.  Labs:  Hgb: 12.9  WBC: 4.55  INR: 2.3  Sodium: 126  Creatinine: 1.0 (baseline)  UA: Negative         PAST MEDICAL & SURGICAL HISTORY:  Hyperlipidemia      Ischemic cardiomyopathy      CHF (congestive heart failure)      LVAD (left ventricular assist device) present  2016      Afib      History of BPH      CAD (coronary artery disease)      H/O gastroesophageal reflux (GERD)      FH: osteopenia      Hypotension, chronic      S/P triple vessel bypass        Presence of cardiac defibrillator        S/P hernia repair          penicillins (Hives; Rash)    Home Medications:  amiodarone 100 mg oral tablet: 2 tab(s) orally once a day (10 Sep 2019 12:46)  Aspir 81:  (10 Sep 2019 12:46)  atorvastatin 20 mg oral tablet: 1 tab(s) orally once a day (10 Sep 2019 12:46)  Calcium 500+D oral tablet, chewable: 1 tab(s) orally once a day (10 Sep 2019 12:46)  digoxin 125 mcg (0.125 mg) oral tablet: 1 tab(s) orally monady  (10 Sep 2019 12:46)  Feosol Caplet:  (10 Sep 2019 12:46)  folic acid:  (10 Sep 2019 12:46)  minocycline 100 mg oral tablet: 1 tab(s) orally  (10 Sep 2019 12:46)  warfarin 1 mg oral tablet: 1 tab(s) orally once a day (10 Sep 2019 12:46)    No permtinent family history of PVD    ALLERGIC/IMMUNOLOGIC:            negative    12 point ROS otherwise normal except as stated in HPI  FHx: none  SHX:  [ ]  smoking     [ ] alcohol use    PHYSICAL EXAM  Vital Signs Last 24 Hrs  T(C): 36.6 (08 May 2025 15:56), Max: 36.9 (08 May 2025 14:08)  T(F): 97.9 (08 May 2025 15:56), Max: 98.5 (08 May 2025 14:08)  HR: 80 (08 May 2025 15:56) (76 - 80)  BP: 127/90 (08 May 2025 15:56) (121/73 - 127/90)  BP(mean): --  RR: 16 (08 May 2025 15:56) (16 - 16)  SpO2: 99% (08 May 2025 15:56) (96% - 99%)    Parameters below as of 08 May 2025 15:56  Patient On (Oxygen Delivery Method): room air    PHYSICAL EXAM:  • General:  Patient is in no acute distress. Appears comfortable at rest.  • Neurological:  Alert and oriented to person, place, and time (A&O x3).  Cranial nerves II–XII intact. No focal neurological deficits noted.  • Head:  Normocephalic, atraumatic. No headache or scalp tenderness reported.  • Neck/Spine:  No cervical lymphadenopathy. No neck pain or midline spinal tenderness.  • Respiratory:  Lungs clear to auscultation bilaterally. Normal chest wall expansion. No respiratory distress.  • Cardiovascular:  Regular rate and rhythm. S1 and S2 present. No murmurs, rubs, or gallops.  Peripheral pulses (DP and PT) are 2+ bilaterally and symmetric.  • Abdomen:  Soft, non-distended. Mild tenderness in the suprapubic region.  No rebound, guarding, or signs of peritonitis. No flank tenderness.  • Extremities:  No edema. Warm and well-perfused. Pulses as above.      PULSES:  Dorsal Pedal: signals  Posterior Tibial: signals      MEDICATIONS:   MEDICATIONS  (STANDING):    MEDICATIONS  (PRN):      LAB/STUDIES:                        12.9   4.55  )-----------( 150      ( 08 May 2025 15:26 )             38.3     05-08    126[L]  |  93[L]  |  22[H]  ----------------------------<  96  4.8   |  25  |  1.0    Ca    9.1      08 May 2025 15:26    TPro  6.6  /  Alb  4.1  /  TBili  1.1  /  DBili  x   /  AST  25  /  ALT  11  /  AlkPhos  91  05-08    PT/INR - ( 08 May 2025 15:26 )   PT: 27.80 sec;   INR: 2.32 ratio         PTT - ( 08 May 2025 15:26 )  PTT:40.7 sec  LIVER FUNCTIONS - ( 08 May 2025 15:26 )  Alb: 4.1 g/dL / Pro: 6.6 g/dL / ALK PHOS: 91 U/L / ALT: 11 U/L / AST: 25 U/L / GGT: x             Urinalysis Basic - ( 08 May 2025 15:28 )    Color: Yellow / Appearance: Clear / S.009 / pH: x  Gluc: x / Ketone: Negative mg/dL  / Bili: Negative / Urobili: 1.0 mg/dL   Blood: x / Protein: Negative mg/dL / Nitrite: Negative   Leuk Esterase: Negative / RBC: x / WBC x   Sq Epi: x / Non Sq Epi: x / Bacteria: x      Urinalysis with Rflx Culture (collected 08 May 2025 15:28)    ASSESSMENT:  Dashawn Sorto is a 89-year-old male who is hemodynamically stable with a focal, segmental infrarenal aortic dissection noted on CT imaging. There are no signs of rupture or malperfusion. On physical exam, he has mild suprapubic tenderness without peritoneal signs, and his dorsalis pedis and posterior tibial pulses are 2+ and symmetric bilaterally. His presentation and stability support conservative management at this time.    PLAN:  • No acute vascular intervention  • Outpatient follow-up with Dr. Reyes in 2 weeks.  • Notify Cardiology and ensure his 0LVAD team are aware.    VASCULAR SURGERY SPECTRA: [id number]

## 2025-05-08 NOTE — ED PROVIDER NOTE - NSFOLLOWUPINSTRUCTIONS_ED_ALL_ED_FT
Patient to be discharged from ED. Any available test results were discussed with patient and/or family. Verbal instructions given, including instructions to return to ED immediately for any new, worsening, or concerning symptoms. Patient endorsed understanding. Written discharge instructions additionally given, including follow-up plan.    Please follow up with Dr. Reyes in 1-2 wks.   Follow up with PMD in 2-3 days.

## 2025-05-08 NOTE — ED PROVIDER NOTE - CONSIDERATION OF ADMISSION OBSERVATION
Consideration of Admission/Observation [place] noted.  Transfer to [facility] offered to patient.  Family prefers to go home and follow-up with PMD tomorrow.  Strict return precautions provided.

## 2025-06-10 ENCOUNTER — NON-APPOINTMENT (OUTPATIENT)
Age: 89
End: 2025-06-10

## 2025-06-10 ENCOUNTER — APPOINTMENT (OUTPATIENT)
Dept: UROLOGY | Facility: CLINIC | Age: 89
End: 2025-06-10
Payer: MEDICARE

## 2025-06-10 VITALS
OXYGEN SATURATION: 94 % | WEIGHT: 125 LBS | HEART RATE: 94 BPM | DIASTOLIC BLOOD PRESSURE: 75 MMHG | HEIGHT: 65 IN | SYSTOLIC BLOOD PRESSURE: 97 MMHG | BODY MASS INDEX: 20.83 KG/M2

## 2025-06-10 PROCEDURE — 99203 OFFICE O/P NEW LOW 30 MIN: CPT

## 2025-06-10 RX ORDER — LATANOPROST/PF 0.005 %
0.01 DROPS OPHTHALMIC (EYE)
Refills: 0 | Status: ACTIVE | COMMUNITY

## 2025-06-10 RX ORDER — DORZOLAMIDE HYDROCHLORIDE TIMOLOL MALEATE 20; 5 MG/ML; MG/ML
2-0.5 SOLUTION/ DROPS OPHTHALMIC
Refills: 0 | Status: ACTIVE | COMMUNITY

## 2025-06-10 RX ORDER — FINASTERIDE 5 MG/1
5 TABLET, FILM COATED ORAL
Qty: 90 | Refills: 3 | Status: ACTIVE | COMMUNITY
Start: 2025-06-10 | End: 1900-01-01

## 2025-06-10 RX ORDER — WARFARIN 1 MG/1
1 TABLET ORAL
Refills: 0 | Status: ACTIVE | COMMUNITY

## 2025-06-10 RX ORDER — DUPILUMAB 300 MG/2ML
300 INJECTION, SOLUTION SUBCUTANEOUS
Refills: 0 | Status: ACTIVE | COMMUNITY

## 2025-06-10 RX ORDER — SULFAMETHOXAZOLE AND TRIMETHOPRIM 800; 160 MG/1; MG/1
800-160 TABLET ORAL
Refills: 0 | Status: ACTIVE | COMMUNITY